# Patient Record
Sex: FEMALE | Race: WHITE | NOT HISPANIC OR LATINO | Employment: FULL TIME | URBAN - NONMETROPOLITAN AREA
[De-identification: names, ages, dates, MRNs, and addresses within clinical notes are randomized per-mention and may not be internally consistent; named-entity substitution may affect disease eponyms.]

---

## 2017-01-11 ENCOUNTER — APPOINTMENT (OUTPATIENT)
Dept: PHYSICAL THERAPY | Facility: CLINIC | Age: 62
End: 2017-01-11
Payer: COMMERCIAL

## 2017-01-11 PROCEDURE — 97162 PT EVAL MOD COMPLEX 30 MIN: CPT

## 2017-01-12 ENCOUNTER — APPOINTMENT (OUTPATIENT)
Dept: PHYSICAL THERAPY | Facility: CLINIC | Age: 62
End: 2017-01-12
Payer: COMMERCIAL

## 2017-02-06 ENCOUNTER — APPOINTMENT (OUTPATIENT)
Dept: PHYSICAL THERAPY | Facility: CLINIC | Age: 62
End: 2017-02-06
Payer: COMMERCIAL

## 2017-02-06 PROCEDURE — 97110 THERAPEUTIC EXERCISES: CPT

## 2017-02-06 PROCEDURE — 97140 MANUAL THERAPY 1/> REGIONS: CPT

## 2017-02-06 PROCEDURE — 97162 PT EVAL MOD COMPLEX 30 MIN: CPT

## 2017-02-08 ENCOUNTER — APPOINTMENT (OUTPATIENT)
Dept: PHYSICAL THERAPY | Facility: CLINIC | Age: 62
End: 2017-02-08
Payer: COMMERCIAL

## 2017-02-08 PROCEDURE — 97140 MANUAL THERAPY 1/> REGIONS: CPT

## 2017-02-08 PROCEDURE — 97110 THERAPEUTIC EXERCISES: CPT

## 2017-02-13 ENCOUNTER — APPOINTMENT (OUTPATIENT)
Dept: PHYSICAL THERAPY | Facility: CLINIC | Age: 62
End: 2017-02-13
Payer: COMMERCIAL

## 2017-02-15 ENCOUNTER — APPOINTMENT (OUTPATIENT)
Dept: PHYSICAL THERAPY | Facility: CLINIC | Age: 62
End: 2017-02-15
Payer: COMMERCIAL

## 2017-02-15 PROCEDURE — 97110 THERAPEUTIC EXERCISES: CPT

## 2017-02-15 PROCEDURE — 97140 MANUAL THERAPY 1/> REGIONS: CPT

## 2017-02-16 ENCOUNTER — APPOINTMENT (OUTPATIENT)
Dept: PHYSICAL THERAPY | Facility: CLINIC | Age: 62
End: 2017-02-16
Payer: COMMERCIAL

## 2017-02-16 PROCEDURE — 97140 MANUAL THERAPY 1/> REGIONS: CPT

## 2017-02-16 PROCEDURE — 97110 THERAPEUTIC EXERCISES: CPT

## 2017-02-20 ENCOUNTER — APPOINTMENT (OUTPATIENT)
Dept: PHYSICAL THERAPY | Facility: CLINIC | Age: 62
End: 2017-02-20
Payer: COMMERCIAL

## 2017-02-20 PROCEDURE — 97140 MANUAL THERAPY 1/> REGIONS: CPT

## 2017-02-20 PROCEDURE — 97110 THERAPEUTIC EXERCISES: CPT

## 2017-02-22 ENCOUNTER — APPOINTMENT (OUTPATIENT)
Dept: PHYSICAL THERAPY | Facility: CLINIC | Age: 62
End: 2017-02-22
Payer: COMMERCIAL

## 2017-02-22 PROCEDURE — 97140 MANUAL THERAPY 1/> REGIONS: CPT

## 2017-02-22 PROCEDURE — 97110 THERAPEUTIC EXERCISES: CPT

## 2017-02-23 ENCOUNTER — APPOINTMENT (OUTPATIENT)
Dept: PHYSICAL THERAPY | Facility: CLINIC | Age: 62
End: 2017-02-23
Payer: COMMERCIAL

## 2017-02-27 ENCOUNTER — APPOINTMENT (OUTPATIENT)
Dept: PHYSICAL THERAPY | Facility: CLINIC | Age: 62
End: 2017-02-27
Payer: COMMERCIAL

## 2017-03-01 ENCOUNTER — APPOINTMENT (OUTPATIENT)
Dept: PHYSICAL THERAPY | Facility: CLINIC | Age: 62
End: 2017-03-01
Payer: COMMERCIAL

## 2017-03-01 PROCEDURE — 97110 THERAPEUTIC EXERCISES: CPT

## 2017-03-01 PROCEDURE — 97140 MANUAL THERAPY 1/> REGIONS: CPT

## 2017-03-02 ENCOUNTER — APPOINTMENT (OUTPATIENT)
Dept: PHYSICAL THERAPY | Facility: CLINIC | Age: 62
End: 2017-03-02
Payer: COMMERCIAL

## 2017-03-08 ENCOUNTER — APPOINTMENT (OUTPATIENT)
Dept: PHYSICAL THERAPY | Facility: CLINIC | Age: 62
End: 2017-03-08
Payer: COMMERCIAL

## 2017-03-08 PROCEDURE — 97140 MANUAL THERAPY 1/> REGIONS: CPT

## 2017-03-08 PROCEDURE — 97110 THERAPEUTIC EXERCISES: CPT

## 2017-03-09 ENCOUNTER — APPOINTMENT (OUTPATIENT)
Dept: PHYSICAL THERAPY | Facility: CLINIC | Age: 62
End: 2017-03-09
Payer: COMMERCIAL

## 2017-03-13 ENCOUNTER — APPOINTMENT (OUTPATIENT)
Dept: PHYSICAL THERAPY | Facility: CLINIC | Age: 62
End: 2017-03-13
Payer: COMMERCIAL

## 2017-03-13 PROCEDURE — 97164 PT RE-EVAL EST PLAN CARE: CPT

## 2017-03-13 PROCEDURE — 97110 THERAPEUTIC EXERCISES: CPT

## 2017-03-13 PROCEDURE — 97140 MANUAL THERAPY 1/> REGIONS: CPT

## 2017-03-16 ENCOUNTER — APPOINTMENT (OUTPATIENT)
Dept: PHYSICAL THERAPY | Facility: CLINIC | Age: 62
End: 2017-03-16
Payer: COMMERCIAL

## 2017-03-20 ENCOUNTER — APPOINTMENT (OUTPATIENT)
Dept: PHYSICAL THERAPY | Facility: CLINIC | Age: 62
End: 2017-03-20
Payer: COMMERCIAL

## 2017-03-22 ENCOUNTER — APPOINTMENT (OUTPATIENT)
Dept: PHYSICAL THERAPY | Facility: CLINIC | Age: 62
End: 2017-03-22
Payer: COMMERCIAL

## 2017-03-22 PROCEDURE — 97110 THERAPEUTIC EXERCISES: CPT

## 2017-03-22 PROCEDURE — 97140 MANUAL THERAPY 1/> REGIONS: CPT

## 2017-04-18 ENCOUNTER — APPOINTMENT (OUTPATIENT)
Dept: PHYSICAL THERAPY | Facility: CLINIC | Age: 62
End: 2017-04-18
Payer: COMMERCIAL

## 2017-04-18 PROCEDURE — 97140 MANUAL THERAPY 1/> REGIONS: CPT

## 2017-04-18 PROCEDURE — 97161 PT EVAL LOW COMPLEX 20 MIN: CPT

## 2017-04-20 ENCOUNTER — APPOINTMENT (OUTPATIENT)
Dept: PHYSICAL THERAPY | Facility: CLINIC | Age: 62
End: 2017-04-20
Payer: COMMERCIAL

## 2017-04-20 PROCEDURE — 97140 MANUAL THERAPY 1/> REGIONS: CPT

## 2017-04-20 PROCEDURE — 97110 THERAPEUTIC EXERCISES: CPT

## 2017-04-24 ENCOUNTER — APPOINTMENT (OUTPATIENT)
Dept: PHYSICAL THERAPY | Facility: CLINIC | Age: 62
End: 2017-04-24
Payer: COMMERCIAL

## 2017-04-24 PROCEDURE — 97140 MANUAL THERAPY 1/> REGIONS: CPT

## 2017-04-24 PROCEDURE — 97110 THERAPEUTIC EXERCISES: CPT

## 2017-04-26 ENCOUNTER — APPOINTMENT (OUTPATIENT)
Dept: PHYSICAL THERAPY | Facility: CLINIC | Age: 62
End: 2017-04-26
Payer: COMMERCIAL

## 2017-04-26 PROCEDURE — 97140 MANUAL THERAPY 1/> REGIONS: CPT

## 2017-04-26 PROCEDURE — 97110 THERAPEUTIC EXERCISES: CPT

## 2017-05-01 ENCOUNTER — APPOINTMENT (OUTPATIENT)
Dept: PHYSICAL THERAPY | Facility: CLINIC | Age: 62
End: 2017-05-01
Payer: COMMERCIAL

## 2017-05-03 ENCOUNTER — APPOINTMENT (OUTPATIENT)
Dept: PHYSICAL THERAPY | Facility: CLINIC | Age: 62
End: 2017-05-03
Payer: COMMERCIAL

## 2017-05-04 ENCOUNTER — APPOINTMENT (OUTPATIENT)
Dept: PHYSICAL THERAPY | Facility: CLINIC | Age: 62
End: 2017-05-04
Payer: COMMERCIAL

## 2017-05-04 PROCEDURE — 97140 MANUAL THERAPY 1/> REGIONS: CPT

## 2017-05-04 PROCEDURE — 97110 THERAPEUTIC EXERCISES: CPT

## 2017-05-04 PROCEDURE — G0283 ELEC STIM OTHER THAN WOUND: HCPCS

## 2017-05-04 PROCEDURE — 97014 ELECTRIC STIMULATION THERAPY: CPT

## 2017-05-08 ENCOUNTER — APPOINTMENT (OUTPATIENT)
Dept: PHYSICAL THERAPY | Facility: CLINIC | Age: 62
End: 2017-05-08
Payer: COMMERCIAL

## 2017-05-08 PROCEDURE — 64550 HB APPLY NEUROSTIMULATOR: CPT

## 2017-05-08 PROCEDURE — G0283 ELEC STIM OTHER THAN WOUND: HCPCS

## 2017-05-08 PROCEDURE — 97140 MANUAL THERAPY 1/> REGIONS: CPT

## 2017-05-08 PROCEDURE — 97014 ELECTRIC STIMULATION THERAPY: CPT

## 2017-05-08 PROCEDURE — 97110 THERAPEUTIC EXERCISES: CPT

## 2017-05-10 ENCOUNTER — APPOINTMENT (OUTPATIENT)
Dept: PHYSICAL THERAPY | Facility: CLINIC | Age: 62
End: 2017-05-10
Payer: COMMERCIAL

## 2017-05-10 PROCEDURE — 97140 MANUAL THERAPY 1/> REGIONS: CPT

## 2017-05-10 PROCEDURE — 97035 APP MDLTY 1+ULTRASOUND EA 15: CPT

## 2017-05-10 PROCEDURE — 97110 THERAPEUTIC EXERCISES: CPT

## 2017-05-15 ENCOUNTER — APPOINTMENT (OUTPATIENT)
Dept: PHYSICAL THERAPY | Facility: CLINIC | Age: 62
End: 2017-05-15
Payer: COMMERCIAL

## 2017-05-15 PROCEDURE — 97035 APP MDLTY 1+ULTRASOUND EA 15: CPT

## 2017-05-15 PROCEDURE — 97140 MANUAL THERAPY 1/> REGIONS: CPT

## 2017-05-15 PROCEDURE — 97110 THERAPEUTIC EXERCISES: CPT

## 2017-05-17 ENCOUNTER — APPOINTMENT (OUTPATIENT)
Dept: PHYSICAL THERAPY | Facility: CLINIC | Age: 62
End: 2017-05-17
Payer: COMMERCIAL

## 2017-05-18 ENCOUNTER — APPOINTMENT (OUTPATIENT)
Dept: PHYSICAL THERAPY | Facility: CLINIC | Age: 62
End: 2017-05-18
Payer: COMMERCIAL

## 2017-05-22 ENCOUNTER — APPOINTMENT (OUTPATIENT)
Dept: PHYSICAL THERAPY | Facility: CLINIC | Age: 62
End: 2017-05-22
Payer: COMMERCIAL

## 2017-05-22 PROCEDURE — 97140 MANUAL THERAPY 1/> REGIONS: CPT

## 2017-05-22 PROCEDURE — 97035 APP MDLTY 1+ULTRASOUND EA 15: CPT

## 2017-05-22 PROCEDURE — 97110 THERAPEUTIC EXERCISES: CPT

## 2017-05-22 PROCEDURE — 97164 PT RE-EVAL EST PLAN CARE: CPT

## 2017-05-24 ENCOUNTER — APPOINTMENT (OUTPATIENT)
Dept: PHYSICAL THERAPY | Facility: CLINIC | Age: 62
End: 2017-05-24
Payer: COMMERCIAL

## 2017-05-25 ENCOUNTER — APPOINTMENT (OUTPATIENT)
Dept: PHYSICAL THERAPY | Facility: CLINIC | Age: 62
End: 2017-05-25
Payer: COMMERCIAL

## 2017-05-25 PROCEDURE — 97110 THERAPEUTIC EXERCISES: CPT

## 2017-05-25 PROCEDURE — 97140 MANUAL THERAPY 1/> REGIONS: CPT

## 2017-05-25 PROCEDURE — 97035 APP MDLTY 1+ULTRASOUND EA 15: CPT

## 2017-05-31 ENCOUNTER — APPOINTMENT (OUTPATIENT)
Dept: PHYSICAL THERAPY | Facility: CLINIC | Age: 62
End: 2017-05-31
Payer: COMMERCIAL

## 2017-05-31 PROCEDURE — 97110 THERAPEUTIC EXERCISES: CPT

## 2017-05-31 PROCEDURE — 97140 MANUAL THERAPY 1/> REGIONS: CPT

## 2017-05-31 PROCEDURE — 97035 APP MDLTY 1+ULTRASOUND EA 15: CPT

## 2017-06-01 ENCOUNTER — APPOINTMENT (OUTPATIENT)
Dept: PHYSICAL THERAPY | Facility: CLINIC | Age: 62
End: 2017-06-01
Payer: COMMERCIAL

## 2017-06-01 PROCEDURE — 97140 MANUAL THERAPY 1/> REGIONS: CPT

## 2017-06-01 PROCEDURE — 97110 THERAPEUTIC EXERCISES: CPT

## 2017-06-07 ENCOUNTER — APPOINTMENT (OUTPATIENT)
Dept: PHYSICAL THERAPY | Facility: CLINIC | Age: 62
End: 2017-06-07
Payer: COMMERCIAL

## 2017-06-07 PROCEDURE — 97110 THERAPEUTIC EXERCISES: CPT

## 2017-06-07 PROCEDURE — 97140 MANUAL THERAPY 1/> REGIONS: CPT

## 2017-06-08 ENCOUNTER — APPOINTMENT (OUTPATIENT)
Dept: PHYSICAL THERAPY | Facility: CLINIC | Age: 62
End: 2017-06-08
Payer: COMMERCIAL

## 2017-06-08 PROCEDURE — 97110 THERAPEUTIC EXERCISES: CPT

## 2017-06-08 PROCEDURE — 97140 MANUAL THERAPY 1/> REGIONS: CPT

## 2017-06-12 ENCOUNTER — APPOINTMENT (OUTPATIENT)
Dept: PHYSICAL THERAPY | Facility: CLINIC | Age: 62
End: 2017-06-12
Payer: COMMERCIAL

## 2017-06-14 ENCOUNTER — APPOINTMENT (OUTPATIENT)
Dept: PHYSICAL THERAPY | Facility: CLINIC | Age: 62
End: 2017-06-14
Payer: COMMERCIAL

## 2017-06-14 PROCEDURE — 97140 MANUAL THERAPY 1/> REGIONS: CPT

## 2017-06-14 PROCEDURE — 97110 THERAPEUTIC EXERCISES: CPT

## 2017-06-15 ENCOUNTER — APPOINTMENT (OUTPATIENT)
Dept: PHYSICAL THERAPY | Facility: CLINIC | Age: 62
End: 2017-06-15
Payer: COMMERCIAL

## 2017-06-15 PROCEDURE — 97110 THERAPEUTIC EXERCISES: CPT

## 2017-06-15 PROCEDURE — 97140 MANUAL THERAPY 1/> REGIONS: CPT

## 2017-06-19 ENCOUNTER — APPOINTMENT (OUTPATIENT)
Dept: PHYSICAL THERAPY | Facility: CLINIC | Age: 62
End: 2017-06-19
Payer: COMMERCIAL

## 2017-06-21 ENCOUNTER — APPOINTMENT (OUTPATIENT)
Dept: PHYSICAL THERAPY | Facility: CLINIC | Age: 62
End: 2017-06-21
Payer: COMMERCIAL

## 2017-06-21 PROCEDURE — 97140 MANUAL THERAPY 1/> REGIONS: CPT

## 2017-06-21 PROCEDURE — 97110 THERAPEUTIC EXERCISES: CPT

## 2017-06-22 ENCOUNTER — APPOINTMENT (OUTPATIENT)
Dept: PHYSICAL THERAPY | Facility: CLINIC | Age: 62
End: 2017-06-22
Payer: COMMERCIAL

## 2017-06-28 ENCOUNTER — APPOINTMENT (OUTPATIENT)
Dept: PHYSICAL THERAPY | Facility: CLINIC | Age: 62
End: 2017-06-28
Payer: COMMERCIAL

## 2017-06-28 PROCEDURE — 97140 MANUAL THERAPY 1/> REGIONS: CPT

## 2017-06-28 PROCEDURE — 97110 THERAPEUTIC EXERCISES: CPT

## 2017-06-29 ENCOUNTER — APPOINTMENT (OUTPATIENT)
Dept: PHYSICAL THERAPY | Facility: CLINIC | Age: 62
End: 2017-06-29
Payer: COMMERCIAL

## 2017-06-29 PROCEDURE — 97110 THERAPEUTIC EXERCISES: CPT

## 2017-06-29 PROCEDURE — 97140 MANUAL THERAPY 1/> REGIONS: CPT

## 2018-10-03 ENCOUNTER — DOCTOR'S OFFICE (OUTPATIENT)
Dept: URBAN - NONMETROPOLITAN AREA CLINIC 1 | Facility: CLINIC | Age: 63
Setting detail: OPHTHALMOLOGY
End: 2018-10-03
Payer: COMMERCIAL

## 2018-10-03 ENCOUNTER — OPTICAL OFFICE (OUTPATIENT)
Dept: URBAN - NONMETROPOLITAN AREA CLINIC 4 | Facility: CLINIC | Age: 63
Setting detail: OPHTHALMOLOGY
End: 2018-10-03
Payer: COMMERCIAL

## 2018-10-03 ENCOUNTER — RX ONLY (RX ONLY)
Age: 63
End: 2018-10-03

## 2018-10-03 DIAGNOSIS — H52.223: ICD-10-CM

## 2018-10-03 DIAGNOSIS — H52.4: ICD-10-CM

## 2018-10-03 DIAGNOSIS — H35.3131: ICD-10-CM

## 2018-10-03 DIAGNOSIS — H35.3111: ICD-10-CM

## 2018-10-03 DIAGNOSIS — H35.3121: ICD-10-CM

## 2018-10-03 DIAGNOSIS — H52.13: ICD-10-CM

## 2018-10-03 PROCEDURE — V2020 VISION SVCS FRAMES PURCHASES: HCPCS | Performed by: OPTOMETRIST

## 2018-10-03 PROCEDURE — 92004 COMPRE OPH EXAM NEW PT 1/>: CPT | Performed by: OPTOMETRIST

## 2018-10-03 PROCEDURE — 92134 CPTRZ OPH DX IMG PST SGM RTA: CPT | Performed by: OPTOMETRIST

## 2018-10-03 PROCEDURE — 92015 DETERMINE REFRACTIVE STATE: CPT | Performed by: OPTOMETRIST

## 2018-10-03 PROCEDURE — V2784 LENS POLYCARB OR EQUAL: HCPCS | Performed by: OPTOMETRIST

## 2018-10-03 PROCEDURE — V2025 EYEGLASSES DELUX FRAMES: HCPCS | Performed by: OPTOMETRIST

## 2018-10-03 PROCEDURE — 92250 FUNDUS PHOTOGRAPHY W/I&R: CPT | Performed by: OPTOMETRIST

## 2018-10-03 PROCEDURE — V2103 SPHEROCYLINDR 4.00D/12-2.00D: HCPCS | Performed by: OPTOMETRIST

## 2018-10-03 ASSESSMENT — REFRACTION_AUTOREFRACTION
OD_AXIS: 167
OS_SPHERE: -1.00
OS_AXIS: 151
OD_CYLINDER: -0.75
OS_CYLINDER: -1.00
OD_SPHERE: -0.50

## 2018-10-03 ASSESSMENT — REFRACTION_MANIFEST
OS_CYLINDER: -0.75
OD_ADD: +2.50
OS_VA3: 20/
OS_VA3: 20/
OS_VA2: 20/
OD_AXIS: 150
OD_VA3: 20/
OD_VA3: 20/
OD_VA2: 20/20-2
OU_VA: 20/
OD_VA1: 20/
OS_SPHERE: -1.50
OD_VA1: 20/20-2
OS_VA1: 20/20-2
OD_SPHERE: -1.00
OS_VA1: 20/
OS_VA2: 20/20-2
OD_CYLINDER: -1.00
OD_VA2: 20/
OS_AXIS: 150
OU_VA: 20/
OS_ADD: +2.50

## 2018-10-03 ASSESSMENT — SPHEQUIV_DERIVED
OS_SPHEQUIV: -1.5
OD_SPHEQUIV: -1.5
OD_SPHEQUIV: -0.875
OS_SPHEQUIV: -1.875

## 2018-10-03 ASSESSMENT — REFRACTION_CURRENTRX
OS_OVR_VA: 20/
OS_AXIS: 153
OD_VPRISM_DIRECTION: SV
OD_AXIS: 150
OD_CYLINDER: -1.00
OS_CYLINDER: -0.75
OD_SPHERE: -2.00
OS_VPRISM_DIRECTION: SV
OS_OVR_VA: 20/
OS_OVR_VA: 20/
OD_OVR_VA: 20/
OS_SPHERE: -2.50
OD_OVR_VA: 20/
OD_OVR_VA: 20/

## 2018-10-03 ASSESSMENT — KERATOMETRY
OD_AXISANGLE_DEGREES: 176
OS_AXISANGLE_DEGREES: 178
OD_K1POWER_DIOPTERS: 43.00
OS_K2POWER_DIOPTERS: 44.25
OD_K2POWER_DIOPTERS: 44.00
OS_K1POWER_DIOPTERS: 43.00

## 2018-10-03 ASSESSMENT — VISUAL ACUITY
OS_BCVA: 20/25-1
OD_BCVA: 20/30

## 2018-10-03 ASSESSMENT — AXIALLENGTH_DERIVED
OD_AL: 24.1912
OD_AL: 23.9378
OS_AL: 24.143
OS_AL: 24.297

## 2018-10-03 ASSESSMENT — CONFRONTATIONAL VISUAL FIELD TEST (CVF)
OD_FINDINGS: FULL
OS_FINDINGS: FULL

## 2019-10-04 ENCOUNTER — DOCTOR'S OFFICE (OUTPATIENT)
Dept: URBAN - NONMETROPOLITAN AREA CLINIC 1 | Facility: CLINIC | Age: 64
Setting detail: OPHTHALMOLOGY
End: 2019-10-04
Payer: COMMERCIAL

## 2019-10-04 ENCOUNTER — RX ONLY (RX ONLY)
Age: 64
End: 2019-10-04

## 2019-10-04 DIAGNOSIS — H35.3111: ICD-10-CM

## 2019-10-04 DIAGNOSIS — H35.3121: ICD-10-CM

## 2019-10-04 DIAGNOSIS — H25.13: ICD-10-CM

## 2019-10-04 PROCEDURE — 92134 CPTRZ OPH DX IMG PST SGM RTA: CPT | Performed by: OPTOMETRIST

## 2019-10-04 PROCEDURE — 92014 COMPRE OPH EXAM EST PT 1/>: CPT | Performed by: OPTOMETRIST

## 2019-10-04 ASSESSMENT — REFRACTION_MANIFEST
OS_VA2: 20/20-2
OD_VA1: 20/20-2
OD_VA3: 20/
OS_ADD: +2.50
OU_VA: 20/
OU_VA: 20/
OS_VA2: 20/
OD_VA1: 20/
OD_VA2: 20/
OD_VA2: 20/20-2
OD_VA3: 20/
OS_VA1: 20/20-2
OS_CYLINDER: -0.75
OD_CYLINDER: -1.00
OD_SPHERE: -1.00
OD_ADD: +2.50
OD_AXIS: 150
OS_VA3: 20/
OS_SPHERE: -1.50
OS_AXIS: 150
OS_VA1: 20/
OS_VA3: 20/

## 2019-10-04 ASSESSMENT — SPHEQUIV_DERIVED
OS_SPHEQUIV: -1.5
OD_SPHEQUIV: -0.875
OS_SPHEQUIV: -1.875
OD_SPHEQUIV: -1.5

## 2019-10-04 ASSESSMENT — REFRACTION_CURRENTRX
OS_OVR_VA: 20/
OD_AXIS: 150
OS_VPRISM_DIRECTION: SV
OS_SPHERE: -2.50
OD_OVR_VA: 20/
OD_OVR_VA: 20/
OS_AXIS: 153
OD_OVR_VA: 20/
OD_SPHERE: -2.00
OS_OVR_VA: 20/
OD_CYLINDER: -1.00
OD_VPRISM_DIRECTION: SV
OS_OVR_VA: 20/
OS_CYLINDER: -0.75

## 2019-10-04 ASSESSMENT — REFRACTION_AUTOREFRACTION
OS_AXIS: 151
OD_CYLINDER: -0.75
OS_CYLINDER: -1.00
OS_SPHERE: -1.00
OD_SPHERE: -0.50
OD_AXIS: 167

## 2019-10-04 ASSESSMENT — CONFRONTATIONAL VISUAL FIELD TEST (CVF)
OS_FINDINGS: FULL
OD_FINDINGS: FULL

## 2019-10-04 ASSESSMENT — VISUAL ACUITY
OS_BCVA: 20/30
OD_BCVA: 20/25

## 2020-10-07 ENCOUNTER — DOCTOR'S OFFICE (OUTPATIENT)
Dept: URBAN - NONMETROPOLITAN AREA CLINIC 1 | Facility: CLINIC | Age: 65
Setting detail: OPHTHALMOLOGY
End: 2020-10-07
Payer: COMMERCIAL

## 2020-10-07 ENCOUNTER — OPTICAL OFFICE (OUTPATIENT)
Dept: URBAN - NONMETROPOLITAN AREA CLINIC 4 | Facility: CLINIC | Age: 65
Setting detail: OPHTHALMOLOGY
End: 2020-10-07
Payer: COMMERCIAL

## 2020-10-07 DIAGNOSIS — H52.223: ICD-10-CM

## 2020-10-07 DIAGNOSIS — H35.3121: ICD-10-CM

## 2020-10-07 DIAGNOSIS — H52.4: ICD-10-CM

## 2020-10-07 DIAGNOSIS — H52.13: ICD-10-CM

## 2020-10-07 DIAGNOSIS — H35.3111: ICD-10-CM

## 2020-10-07 PROCEDURE — V2020 VISION SVCS FRAMES PURCHASES: HCPCS | Performed by: OPTOMETRIST

## 2020-10-07 PROCEDURE — 92134 CPTRZ OPH DX IMG PST SGM RTA: CPT | Performed by: OPTOMETRIST

## 2020-10-07 PROCEDURE — V2784 LENS POLYCARB OR EQUAL: HCPCS | Performed by: OPTOMETRIST

## 2020-10-07 PROCEDURE — V2025 EYEGLASSES DELUX FRAMES: HCPCS | Performed by: OPTOMETRIST

## 2020-10-07 PROCEDURE — V2103 SPHEROCYLINDR 4.00D/12-2.00D: HCPCS | Performed by: OPTOMETRIST

## 2020-10-07 PROCEDURE — 92015 DETERMINE REFRACTIVE STATE: CPT | Performed by: OPTOMETRIST

## 2020-10-07 PROCEDURE — 92014 COMPRE OPH EXAM EST PT 1/>: CPT | Performed by: OPTOMETRIST

## 2020-10-07 ASSESSMENT — REFRACTION_MANIFEST
OS_VA1: 20/20-2
OS_CYLINDER: -0.25
OD_VA1: 20/20-2
OD_AXIS: 165
OS_SPHERE: -1.50
OS_VA2: 20/20-2
OD_VA2: 20/20-2
OS_AXIS: 150
OD_SPHERE: -1.25
OS_ADD: +2.50
OD_CYLINDER: -0.75
OD_ADD: +2.50

## 2020-10-07 ASSESSMENT — REFRACTION_AUTOREFRACTION
OS_CYLINDER: SPH
OD_SPHERE: -1.25
OD_AXIS: 164
OD_CYLINDER: -0.75
OS_SPHERE: -1.75

## 2020-10-07 ASSESSMENT — REFRACTION_CURRENTRX
OD_SPHERE: -1.00
OD_VPRISM_DIRECTION: SV
OD_AXIS: 150
OS_OVR_VA: 20/
OD_CYLINDER: -1.00
OD_OVR_VA: 20/
OS_CYLINDER: -0.75
OS_VPRISM_DIRECTION: SV
OS_SPHERE: -1.50
OS_AXIS: 150

## 2020-10-07 ASSESSMENT — SPHEQUIV_DERIVED
OS_SPHEQUIV: -1.625
OD_SPHEQUIV: -1.625
OD_SPHEQUIV: -1.625

## 2020-10-07 ASSESSMENT — CONFRONTATIONAL VISUAL FIELD TEST (CVF)
OS_FINDINGS: FULL
OD_FINDINGS: FULL

## 2020-10-07 ASSESSMENT — AXIALLENGTH_DERIVED
OS_AL: 24.1941
OD_AL: 24.2425
OD_AL: 24.2425

## 2020-10-07 ASSESSMENT — KERATOMETRY
OS_AXISANGLE_DEGREES: 178
OS_K1POWER_DIOPTERS: 43.00
OD_K1POWER_DIOPTERS: 43.00
OD_K2POWER_DIOPTERS: 44.00
OS_K2POWER_DIOPTERS: 44.25
OD_AXISANGLE_DEGREES: 176

## 2020-10-07 ASSESSMENT — VISUAL ACUITY
OD_BCVA: 20/25-2
OS_BCVA: 20/30

## 2020-10-08 ENCOUNTER — DOCTOR'S OFFICE (OUTPATIENT)
Dept: URBAN - NONMETROPOLITAN AREA CLINIC 1 | Facility: CLINIC | Age: 65
Setting detail: OPHTHALMOLOGY
End: 2020-10-08
Payer: COMMERCIAL

## 2020-10-08 DIAGNOSIS — H35.3131: ICD-10-CM

## 2020-10-08 PROCEDURE — 92134 CPTRZ OPH DX IMG PST SGM RTA: CPT | Performed by: OPTOMETRIST

## 2021-10-08 ENCOUNTER — DOCTOR'S OFFICE (OUTPATIENT)
Dept: URBAN - NONMETROPOLITAN AREA CLINIC 1 | Facility: CLINIC | Age: 66
Setting detail: OPHTHALMOLOGY
End: 2021-10-08
Payer: COMMERCIAL

## 2021-10-08 DIAGNOSIS — H52.13: ICD-10-CM

## 2021-10-08 DIAGNOSIS — H35.3131: ICD-10-CM

## 2021-10-08 DIAGNOSIS — H35.3121: ICD-10-CM

## 2021-10-08 DIAGNOSIS — H35.3111: ICD-10-CM

## 2021-10-08 PROBLEM — H25.13 CATARACT NUCLEAR SCLEROSIS AGE RELATED; BOTH EYES: Status: ACTIVE | Noted: 2018-10-03

## 2021-10-08 PROCEDURE — 92014 COMPRE OPH EXAM EST PT 1/>: CPT | Performed by: OPTOMETRIST

## 2021-10-08 PROCEDURE — 92134 CPTRZ OPH DX IMG PST SGM RTA: CPT | Performed by: OPTOMETRIST

## 2021-10-08 ASSESSMENT — TONOMETRY
OD_IOP_MMHG: 14
OS_IOP_MMHG: 14

## 2021-10-08 ASSESSMENT — REFRACTION_CURRENTRX
OD_VPRISM_DIRECTION: SV
OS_OVR_VA: 20/
OD_CYLINDER: -0.75
OS_SPHERE: -1.50
OS_CYLINDER: -0.25
OD_SPHERE: -1.25
OS_AXIS: 146
OD_OVR_VA: 20/
OS_VPRISM_DIRECTION: SV
OD_AXIS: 168

## 2021-10-08 ASSESSMENT — AXIALLENGTH_DERIVED
OD_AL: 24.2425
OD_AL: 24.0892
OS_AL: 24.0921
OS_AL: 23.9909

## 2021-10-08 ASSESSMENT — SPHEQUIV_DERIVED
OS_SPHEQUIV: -1.375
OD_SPHEQUIV: -1.625
OS_SPHEQUIV: -1.125
OD_SPHEQUIV: -1.25

## 2021-10-08 ASSESSMENT — KERATOMETRY
OD_AXISANGLE_DEGREES: 176
OD_K2POWER_DIOPTERS: 44.00
OS_K1POWER_DIOPTERS: 43.00
OS_K2POWER_DIOPTERS: 44.25
OD_K1POWER_DIOPTERS: 43.00
OS_AXISANGLE_DEGREES: 178

## 2021-10-08 ASSESSMENT — REFRACTION_MANIFEST
OD_SPHERE: -1.25
OD_VA2: 20/20-2
OS_AXIS: 150
OS_VA2: 20/20-2
OD_ADD: +2.50
OS_SPHERE: -1.25
OS_ADD: +2.50
OD_CYLINDER: -0.75
OD_AXIS: 165
OD_VA1: 20/20-2
OS_VA1: 20/20-2
OS_CYLINDER: -0.25

## 2021-10-08 ASSESSMENT — VISUAL ACUITY
OS_BCVA: 20/20-2
OD_BCVA: 20/20-1

## 2021-10-08 ASSESSMENT — REFRACTION_AUTOREFRACTION
OS_SPHERE: -1.00
OD_AXIS: 159
OD_SPHERE: -1.00
OS_CYLINDER: -0.25
OS_AXIS: 124
OD_CYLINDER: -0.50

## 2021-10-08 ASSESSMENT — CONFRONTATIONAL VISUAL FIELD TEST (CVF)
OS_FINDINGS: FULL
OD_FINDINGS: FULL

## 2022-11-30 ENCOUNTER — DOCTOR'S OFFICE (OUTPATIENT)
Dept: URBAN - NONMETROPOLITAN AREA CLINIC 1 | Facility: CLINIC | Age: 67
Setting detail: OPHTHALMOLOGY
End: 2022-11-30
Payer: COMMERCIAL

## 2022-11-30 DIAGNOSIS — H35.3131: ICD-10-CM

## 2022-11-30 DIAGNOSIS — H52.4: ICD-10-CM

## 2022-11-30 DIAGNOSIS — H52.13: ICD-10-CM

## 2022-11-30 PROCEDURE — 92134 CPTRZ OPH DX IMG PST SGM RTA: CPT | Performed by: OPTOMETRIST

## 2022-11-30 PROCEDURE — 92014 COMPRE OPH EXAM EST PT 1/>: CPT | Performed by: OPTOMETRIST

## 2022-11-30 PROCEDURE — 92015 DETERMINE REFRACTIVE STATE: CPT | Performed by: OPTOMETRIST

## 2022-11-30 ASSESSMENT — REFRACTION_MANIFEST
OD_SPHERE: -1.25
OS_VA2: 20/20-2
OD_VA1: 20/20-2
OD_VA2: 20/20-2
OS_AXIS: 150
OD_CYLINDER: -0.75
OS_SPHERE: -1.25
OD_AXIS: 165
OD_ADD: +2.50
OS_ADD: +2.50
OS_VA1: 20/20-2
OS_CYLINDER: -0.25

## 2022-11-30 ASSESSMENT — VISUAL ACUITY
OD_BCVA: 20/20-1
OS_BCVA: 20/25

## 2022-11-30 ASSESSMENT — SPHEQUIV_DERIVED
OS_SPHEQUIV: -1.375
OD_SPHEQUIV: -1.625
OD_SPHEQUIV: -1.25
OS_SPHEQUIV: -1.375

## 2022-11-30 ASSESSMENT — CONFRONTATIONAL VISUAL FIELD TEST (CVF)
OD_FINDINGS: FULL
OS_FINDINGS: FULL

## 2022-11-30 ASSESSMENT — REFRACTION_AUTOREFRACTION
OD_CYLINDER: -0.50
OS_SPHERE: -1.25
OD_SPHERE: -1.00
OS_AXIS: 125
OS_CYLINDER: -0.25
OD_AXIS: 152

## 2022-11-30 ASSESSMENT — REFRACTION_CURRENTRX
OS_SPHERE: -1.25
OD_CYLINDER: -0.75
OS_OVR_VA: 20/
OD_SPHERE: -1.25
OS_VPRISM_DIRECTION: SV
OD_AXIS: 171
OD_VPRISM_DIRECTION: SV
OD_OVR_VA: 20/
OS_AXIS: 155
OS_CYLINDER: -0.25

## 2022-11-30 ASSESSMENT — TONOMETRY
OS_IOP_MMHG: 14
OD_IOP_MMHG: 14

## 2023-12-04 ENCOUNTER — DOCTOR'S OFFICE (OUTPATIENT)
Dept: URBAN - NONMETROPOLITAN AREA CLINIC 1 | Facility: CLINIC | Age: 68
Setting detail: OPHTHALMOLOGY
End: 2023-12-04
Payer: COMMERCIAL

## 2023-12-04 DIAGNOSIS — H52.13: ICD-10-CM

## 2023-12-04 DIAGNOSIS — H35.3131: ICD-10-CM

## 2023-12-04 DIAGNOSIS — H52.4: ICD-10-CM

## 2023-12-04 DIAGNOSIS — H25.13: ICD-10-CM

## 2023-12-04 DIAGNOSIS — H35.3121: ICD-10-CM

## 2023-12-04 PROCEDURE — 92134 CPTRZ OPH DX IMG PST SGM RTA: CPT | Performed by: OPTOMETRIST

## 2023-12-04 PROCEDURE — 92015 DETERMINE REFRACTIVE STATE: CPT | Performed by: OPTOMETRIST

## 2023-12-04 PROCEDURE — 92014 COMPRE OPH EXAM EST PT 1/>: CPT | Performed by: OPTOMETRIST

## 2023-12-04 ASSESSMENT — REFRACTION_MANIFEST
OS_SPHERE: -1.25
OD_VA1: 20/25+2
OS_ADD: +2.50
OS_VA1: 20/20-2
OS_CYLINDER: -0.25
OD_AXIS: 140
OD_VA2: 20/20-2
OS_AXIS: 150
OD_ADD: +2.50
OS_VA2: 20/20-2
OD_SPHERE: -1.25
OD_CYLINDER: -0.75

## 2023-12-04 ASSESSMENT — REFRACTION_CURRENTRX
OD_SPHERE: -1.25
OS_CYLINDER: -0.25
OD_AXIS: 171
OD_VPRISM_DIRECTION: SV
OS_VPRISM_DIRECTION: SV
OS_SPHERE: -1.25
OD_OVR_VA: 20/
OS_OVR_VA: 20/
OD_CYLINDER: -0.75
OS_AXIS: 155

## 2023-12-04 ASSESSMENT — REFRACTION_AUTOREFRACTION
OS_AXIS: 125
OD_CYLINDER: -0.50
OD_SPHERE: -1.00
OD_AXIS: 152
OS_CYLINDER: -0.25
OS_SPHERE: -1.25

## 2023-12-04 ASSESSMENT — SPHEQUIV_DERIVED
OS_SPHEQUIV: -1.375
OS_SPHEQUIV: -1.375
OD_SPHEQUIV: -1.25
OD_SPHEQUIV: -1.625

## 2023-12-04 ASSESSMENT — CONFRONTATIONAL VISUAL FIELD TEST (CVF)
OS_FINDINGS: FULL
OD_FINDINGS: FULL

## 2024-09-05 ENCOUNTER — EVALUATION (OUTPATIENT)
Dept: PHYSICAL THERAPY | Facility: CLINIC | Age: 69
End: 2024-09-05
Payer: COMMERCIAL

## 2024-09-05 DIAGNOSIS — M75.41 IMPINGEMENT SYNDROME OF RIGHT SHOULDER: Primary | ICD-10-CM

## 2024-09-05 PROCEDURE — 97110 THERAPEUTIC EXERCISES: CPT

## 2024-09-05 PROCEDURE — 97161 PT EVAL LOW COMPLEX 20 MIN: CPT

## 2024-09-05 NOTE — LETTER
2024    Marc Vitale MD  [address] [facility]  [address]  [id number]    Patient: Christy Cardozo   YOB: 1955   Date of Visit: 2024     Encounter Diagnosis     ICD-10-CM    1. Impingement syndrome of right shoulder  M75.41           Dear Dr. Vitale:    Thank you for your recent referral of Christy Cardozo. Please review the attached evaluation summary from Christy's recent visit.     Please verify that you agree with the plan of care by signing the attached order.     If you have any questions or concerns, please do not hesitate to call.     I sincerely appreciate the opportunity to share in the care of one of your patients and hope to have another opportunity to work with you in the near future.       Sincerely,    Judah Iglesias, PT      Referring Provider:      I certify that I have read the below Plan of Care and certify the need for these services furnished under this plan of treatment while under my care.                    Marc Vitale MD  [address] [facility]  [address]  [id number]  Via Fax: [phone]          PT Evaluation     Today's date: 2024  Patient name: Christy Cardozo  : 1955  MRN: [id number]  Referring provider: No ref. provider found  Dx:   Encounter Diagnosis     ICD-10-CM    1. Impingement syndrome of right shoulder  M75.41           Start Time: 1345  Stop Time: 1445  Total time in clinic (min): 60 minutes    Assessment  Impairments: abnormal or restricted ROM, activity intolerance and impaired physical strength  Symptom irritability: low    Assessment details: Christy Cardozo is a pleasant 70 y/o with PMH of a mass resection in R shoulder. Presents to therapy with c/o of R shoulder pain and weakness. Today's assessment shows deficits in in R shoulder in all planes, however no pain with resistance today.  symmetrical. Special tests reveal Negative full/empty can, wilda easley, obriens, biceps load. Objective findings,  further detailed below, along with signs and symptoms most consistent with likely rotator cuff tendinitis, likely supraspinatus involvement. As of today, she  notes no pain at rest or with activity resistance free following her injection. Though due to previous flare up with therapy will progress slowly to tolerance. at this time, they would patient would benefit from skilled therapy services to address previously stated impairments in order to maximize function with all ADLs.       Goals  ST-3 weeks  -pt will be able to   ojects ~5# to shoulder height without exacerbation of symptoms.  -pt will be able to improve R shoulder strength to 4+/5 in all planes    LT-66 weeks  -pt will be able to continue to report <1/10 in shoulder with activity  -pt will be able to  10#+ objects and move them to higher shelves pain free  -pt robbi be able to demonstrate improvement in overall function via projected FOTO score of 60      Plan  Patient would benefit from: skilled physical therapy  Referral necessary: No  Planned modality interventions: thermotherapy: hydrocollator packs and electrical stimulation/Russian stimulation    Planned therapy interventions: balance, strengthening, stretching, therapeutic activities, therapeutic exercise and flexibility    Frequency: 2x week  Duration in weeks: 6  Plan of Care beginning date: 2024  Plan of Care expiration date: 10/17/2024        Subjective Evaluation    History of Present Illness  Mechanism of injury: Christy Cardozo states that pain started nearly 2 years ago when she had a mass removed from her right shoulder. She  states she then had therapy following this. However, notes that the pain was so significant afterwards that she stopped therapy due to being unable to raise arm. She has never tried therapy since. She has consistently had pain in shoulder with reaching out to her right side and working over head.  Notes that recent months had significant increase  in clicking/cracking in shoulders, though not painful.  This has been worsening up until she received an injection in her shoulder ~ 2 weeks ago. Notes that today, she has minimal pains and discomfort compared to prior to the injection.           Recurrent probem    Quality of life: fair    Patient Goals  Patient goals for therapy: decreased pain, improved balance, increased motion, increased strength, independence with ADLs/IADLs, return to sport/leisure activities and return to work    Pain  Current pain ratin  At best pain ratin  At worst pain ratin  Location: right shoulder along supraspinatus tendon, lateral upper arm  Quality: grinding and sharp    Hand dominance: right    Treatments  Previous treatment: injection treatment and physical therapy        Objective     Tenderness     Left Shoulder   No tenderness     Right Shoulder  No tenderness     Active Range of Motion   Left Shoulder   Normal active range of motion    Right Shoulder   Normal active range of motion    Strength/Myotome Testing     Left Shoulder   Normal muscle strength    Right Shoulder     Planes of Motion   Flexion: 4   Extension: 4   Abduction: 4   External rotation at 0°: 4   External rotation at 90°: 4       Flowsheet Rows      Flowsheet Row Most Recent Value   PT/OT G-Codes    Current Score 59   Projected Score 60   FOTO information reviewed Yes   Assessment Type Evaluation             Precautions: mass resection of R shoulder     Daily Treatment Diary:      Initial Evaluation Date: 24  Compliance                      Visit Number 1                    Re-Eval  IE                 MC   Foto Captured Y                                                Manual                                                                                        Ther-Ex                      UBE ->            Shldr flx/scap/abd  AROM                                           Wand OH shoulder raises 1#->                                            No monies OTB                                                                 Prone y raise ->                     Prone rear delt raise ->                                           Neuro Re-Ed                                                                                                Ther-Act              moving objects to shoulder height+ When tolerable                                                Modalities

## 2024-09-05 NOTE — PROGRESS NOTES
PT Evaluation     Today's date: 2024  Patient name: Christy Cardozo  : 1955  MRN: [id number]  Referring provider: No ref. provider found  Dx:   Encounter Diagnosis     ICD-10-CM    1. Impingement syndrome of right shoulder  M75.41           Start Time: 1345  Stop Time: 1445  Total time in clinic (min): 60 minutes    Assessment  Impairments: abnormal or restricted ROM, activity intolerance and impaired physical strength  Symptom irritability: low    Assessment details: Christy Cardozo is a pleasant 68 y/o with PMH of a mass resection in R shoulder. Presents to therapy with c/o of R shoulder pain and weakness. Today's assessment shows deficits in in R shoulder in all planes, however no pain with resistance today.  symmetrical. Special tests reveal Negative full/empty can, wilda easley, obriens, biceps load. Objective findings, further detailed below, along with signs and symptoms most consistent with likely rotator cuff tendinitis, likely supraspinatus involvement. As of today, she  notes no pain at rest or with activity resistance free following her injection. Though due to previous flare up with therapy will progress slowly to tolerance. at this time, they would patient would benefit from skilled therapy services to address previously stated impairments in order to maximize function with all ADLs.       Goals  ST-3 weeks  -pt will be able to   ojects ~5# to shoulder height without exacerbation of symptoms.  -pt will be able to improve R shoulder strength to 4+/5 in all planes    LT-66 weeks  -pt will be able to continue to report <1/10 in shoulder with activity  -pt will be able to  10#+ objects and move them to higher shelves pain free  -pt robbi be able to demonstrate improvement in overall function via projected FOTO score of 60      Plan  Patient would benefit from: skilled physical therapy  Referral necessary: No  Planned modality interventions: thermotherapy: hydrocollator  packs and electrical stimulation/Russian stimulation    Planned therapy interventions: balance, strengthening, stretching, therapeutic activities, therapeutic exercise and flexibility    Frequency: 2x week  Duration in weeks: 6  Plan of Care beginning date: 2024  Plan of Care expiration date: 10/17/2024        Subjective Evaluation    History of Present Illness  Mechanism of injury: Christy Cardozo states that pain started nearly 2 years ago when she had a mass removed from her right shoulder. She  states she then had therapy following this. However, notes that the pain was so significant afterwards that she stopped therapy due to being unable to raise arm. She has never tried therapy since. She has consistently had pain in shoulder with reaching out to her right side and working over head.  Notes that recent months had significant increase in clicking/cracking in shoulders, though not painful.  This has been worsening up until she received an injection in her shoulder ~ 2 weeks ago. Notes that today, she has minimal pains and discomfort compared to prior to the injection.           Recurrent probem    Quality of life: fair    Patient Goals  Patient goals for therapy: decreased pain, improved balance, increased motion, increased strength, independence with ADLs/IADLs, return to sport/leisure activities and return to work    Pain  Current pain ratin  At best pain ratin  At worst pain ratin  Location: right shoulder along supraspinatus tendon, lateral upper arm  Quality: grinding and sharp    Hand dominance: right    Treatments  Previous treatment: injection treatment and physical therapy        Objective     Tenderness     Left Shoulder   No tenderness     Right Shoulder  No tenderness     Active Range of Motion   Left Shoulder   Normal active range of motion    Right Shoulder   Normal active range of motion    Strength/Myotome Testing     Left Shoulder   Normal muscle strength    Right Shoulder      Planes of Motion   Flexion: 4   Extension: 4   Abduction: 4   External rotation at 0°: 4   External rotation at 90°: 4       Flowsheet Rows      Flowsheet Row Most Recent Value   PT/OT G-Codes    Current Score 59   Projected Score 60   FOTO information reviewed Yes   Assessment Type Evaluation             Precautions: mass resection of R shoulder     Daily Treatment Diary:      Initial Evaluation Date: 09/06/24  Compliance 9/5                     Visit Number 1                    Re-Eval  IE                 MC   Foto Captured Y                           9/5                     Manual                                                                                        Ther-Ex                      UBE ->            Shldr flx/scap/abd  AROM                                           Wand OH shoulder raises 1#->                                           No monies OTB                                                                 Prone y raise ->                     Prone rear delt raise ->                                           Neuro Re-Ed                                                                                                Ther-Act              moving objects to shoulder height+ When tolerable                                                Modalities

## 2024-09-05 NOTE — LETTER
2024    Manas Crawford MD  [address]  [facility] [address]    Patient: Christy Cardozo   YOB: 1955   Date of Visit: 2024     Encounter Diagnosis     ICD-10-CM    1. Impingement syndrome of right shoulder  M75.41           Dear Dr. Crawford:    Thank you for your recent referral of Christy Cardozo. Please review the attached evaluation summary from Christy's recent visit.     Please verify that you agree with the plan of care by signing the attached order.     If you have any questions or concerns, please do not hesitate to call.     I sincerely appreciate the opportunity to share in the care of one of your patients and hope to have another opportunity to work with you in the near future.       Sincerely,    Judah Iglesias, PT      Referring Provider:      I certify that I have read the below Plan of Care and certify the need for these services furnished under this plan of treatment while under my care.                    Mansa Crawford MD   [address]  [facility] [address]  Via Fax: [phone]          PT Evaluation     Today's date: 2024  Patient name: Christy Cardozo  : 1955  MRN: [id number]  Referring provider: No ref. provider found  Dx:   Encounter Diagnosis     ICD-10-CM    1. Impingement syndrome of right shoulder  M75.41           Start Time: 1345  Stop Time: 1445  Total time in clinic (min): 60 minutes    Assessment  Impairments: abnormal or restricted ROM, activity intolerance and impaired physical strength  Symptom irritability: low    Assessment details: Christy Cardozo is a pleasant 70 y/o with PMH of a mass resection in R shoulder. Presents to therapy with c/o of R shoulder pain and weakness. Today's assessment shows deficits in in R shoulder in all planes, however no pain with resistance today.  symmetrical. Special tests reveal Negative full/empty can, wilda easley, obriens, biceps load. Objective findings, further detailed below, along  with signs and symptoms most consistent with likely rotator cuff tendinitis, likely supraspinatus involvement. As of today, she  notes no pain at rest or with activity resistance free following her injection. Though due to previous flare up with therapy will progress slowly to tolerance. at this time, they would patient would benefit from skilled therapy services to address previously stated impairments in order to maximize function with all ADLs.       Goals  ST-3 weeks  -pt will be able to   ojects ~5# to shoulder height without exacerbation of symptoms.  -pt will be able to improve R shoulder strength to 4+/5 in all planes    LT-66 weeks  -pt will be able to continue to report <1/10 in shoulder with activity  -pt will be able to  10#+ objects and move them to higher shelves pain free  -pt robbi be able to demonstrate improvement in overall function via projected FOTO score of 60      Plan  Patient would benefit from: skilled physical therapy  Referral necessary: No  Planned modality interventions: thermotherapy: hydrocollator packs and electrical stimulation/Russian stimulation    Planned therapy interventions: balance, strengthening, stretching, therapeutic activities, therapeutic exercise and flexibility    Frequency: 2x week  Duration in weeks: 6  Plan of Care beginning date: 2024  Plan of Care expiration date: 10/17/2024        Subjective Evaluation    History of Present Illness  Mechanism of injury: Christy Cardozo states that pain started nearly 2 years ago when she had a mass removed from her right shoulder. She  states she then had therapy following this. However, notes that the pain was so significant afterwards that she stopped therapy due to being unable to raise arm. She has never tried therapy since. She has consistently had pain in shoulder with reaching out to her right side and working over head.  Notes that recent months had significant increase in clicking/cracking in  shoulders, though not painful.  This has been worsening up until she received an injection in her shoulder ~ 2 weeks ago. Notes that today, she has minimal pains and discomfort compared to prior to the injection.           Recurrent probem    Quality of life: fair    Patient Goals  Patient goals for therapy: decreased pain, improved balance, increased motion, increased strength, independence with ADLs/IADLs, return to sport/leisure activities and return to work    Pain  Current pain ratin  At best pain ratin  At worst pain ratin  Location: right shoulder along supraspinatus tendon, lateral upper arm  Quality: grinding and sharp    Hand dominance: right    Treatments  Previous treatment: injection treatment and physical therapy        Objective     Tenderness     Left Shoulder   No tenderness     Right Shoulder  No tenderness     Active Range of Motion   Left Shoulder   Normal active range of motion    Right Shoulder   Normal active range of motion    Strength/Myotome Testing     Left Shoulder   Normal muscle strength    Right Shoulder     Planes of Motion   Flexion: 4   Extension: 4   Abduction: 4   External rotation at 0°: 4   External rotation at 90°: 4       Flowsheet Rows      Flowsheet Row Most Recent Value   PT/OT G-Codes    Current Score 59   Projected Score 60   FOTO information reviewed Yes   Assessment Type Evaluation             Precautions: mass resection of R shoulder     Daily Treatment Diary:      Initial Evaluation Date: 24  Compliance                      Visit Number 1                    Re-Eval  IE                 MC   Foto Captured Y                                                Manual                                                                                        Ther-Ex                      UBE ->            Shldr flx/scap/abd  AROM                                           Wand OH shoulder raises 1#->                                           No monies OTB                                                                  Prone y raise ->                     Prone rear delt raise ->                                           Neuro Re-Ed                                                                                                Ther-Act              moving objects to shoulder height+ When tolerable                                                Modalities

## 2024-09-05 NOTE — LETTER
2024    Manas Crawford MD  [address]  [facility] [address]    Patient: Christy Cardozo   YOB: 1955   Date of Visit: 2024     Encounter Diagnosis     ICD-10-CM    1. Impingement syndrome of right shoulder  M75.41           Dear Dr. Crawford:    Thank you for your recent referral of Christy Cardozo. Please review the attached evaluation summary from Christy's recent visit.     Please verify that you agree with the plan of care by signing the attached order.     If you have any questions or concerns, please do not hesitate to call.     I sincerely appreciate the opportunity to share in the care of one of your patients and hope to have another opportunity to work with you in the near future.       Sincerely,    Judah Iglesias, PT      Referring Provider:      I certify that I have read the below Plan of Care and certify the need for these services furnished under this plan of treatment while under my care.                    Manas Crawford MD   [address]  [facility] [address]  Via Fax: [phone]          PT Evaluation     Today's date: 2024  Patient name: Christy Cardozo  : 1955  MRN: [id number]  Referring provider: No ref. provider found  Dx:   Encounter Diagnosis     ICD-10-CM    1. Impingement syndrome of right shoulder  M75.41           Start Time: 1345  Stop Time: 1445  Total time in clinic (min): 60 minutes    Assessment  Impairments: abnormal or restricted ROM, activity intolerance and impaired physical strength  Symptom irritability: low    Assessment details: Christy Cardozo is a pleasant 70 y/o with PMH of a mass resection in R shoulder. Presents to therapy with c/o of R shoulder pain and weakness. Today's assessment shows deficits in in R shoulder in all planes, however no pain with resistance today.  symmetrical. Special tests reveal Negative full/empty can, wilda easley, obriens, biceps load. Objective findings, further detailed below, along  with signs and symptoms most consistent with likely rotator cuff tendinitis, likely supraspinatus involvement. As of today, she  notes no pain at rest or with activity resistance free following her injection. Though due to previous flare up with therapy will progress slowly to tolerance. at this time, they would patient would benefit from skilled therapy services to address previously stated impairments in order to maximize function with all ADLs.       Goals  ST-3 weeks  -pt will be able to   ojects ~5# to shoulder height without exacerbation of symptoms.  -pt will be able to improve R shoulder strength to 4+/5 in all planes    LT-66 weeks  -pt will be able to continue to report <1/10 in shoulder with activity  -pt will be able to  10#+ objects and move them to higher shelves pain free  -pt robbi be able to demonstrate improvement in overall function via projected FOTO score of 60      Plan  Patient would benefit from: skilled physical therapy  Referral necessary: No  Planned modality interventions: thermotherapy: hydrocollator packs and electrical stimulation/Russian stimulation    Planned therapy interventions: balance, strengthening, stretching, therapeutic activities, therapeutic exercise and flexibility    Frequency: 2x week  Duration in weeks: 6  Plan of Care beginning date: 2024  Plan of Care expiration date: 10/17/2024        Subjective Evaluation    History of Present Illness  Mechanism of injury: Christy Cardozo states that pain started nearly 2 years ago when she had a mass removed from her right shoulder. She  states she then had therapy following this. However, notes that the pain was so significant afterwards that she stopped therapy due to being unable to raise arm. She has never tried therapy since. She has consistently had pain in shoulder with reaching out to her right side and working over head.  Notes that recent months had significant increase in clicking/cracking in  shoulders, though not painful.  This has been worsening up until she received an injection in her shoulder ~ 2 weeks ago. Notes that today, she has minimal pains and discomfort compared to prior to the injection.           Recurrent probem    Quality of life: fair    Patient Goals  Patient goals for therapy: decreased pain, improved balance, increased motion, increased strength, independence with ADLs/IADLs, return to sport/leisure activities and return to work    Pain  Current pain ratin  At best pain ratin  At worst pain ratin  Location: right shoulder along supraspinatus tendon, lateral upper arm  Quality: grinding and sharp    Hand dominance: right    Treatments  Previous treatment: injection treatment and physical therapy        Objective     Tenderness     Left Shoulder   No tenderness     Right Shoulder  No tenderness     Active Range of Motion   Left Shoulder   Normal active range of motion    Right Shoulder   Normal active range of motion    Strength/Myotome Testing     Left Shoulder   Normal muscle strength    Right Shoulder     Planes of Motion   Flexion: 4   Extension: 4   Abduction: 4   External rotation at 0°: 4   External rotation at 90°: 4       Flowsheet Rows      Flowsheet Row Most Recent Value   PT/OT G-Codes    Current Score 59   Projected Score 60   FOTO information reviewed Yes   Assessment Type Evaluation             Precautions: mass resection of R shoulder     Daily Treatment Diary:      Initial Evaluation Date: 24  Compliance                      Visit Number 1                    Re-Eval  IE                 MC   Foto Captured Y                                                Manual                                                                                        Ther-Ex                      UBE ->            Shldr flx/scap/abd  AROM                                           Wand [place] shoulder raises 1#->                                           No monies OTB                                                                  Prone y raise ->                     Prone rear delt raise ->                                           Neuro Re-Ed                                                                                                Ther-Act              moving objects to shoulder height+ When tolerable                                                Modalities

## 2024-09-12 ENCOUNTER — OFFICE VISIT (OUTPATIENT)
Dept: PHYSICAL THERAPY | Facility: CLINIC | Age: 69
End: 2024-09-12
Payer: COMMERCIAL

## 2024-09-12 DIAGNOSIS — M75.41 IMPINGEMENT SYNDROME OF RIGHT SHOULDER: Primary | ICD-10-CM

## 2024-09-12 PROCEDURE — 97112 NEUROMUSCULAR REEDUCATION: CPT

## 2024-09-12 PROCEDURE — 97110 THERAPEUTIC EXERCISES: CPT

## 2024-09-12 NOTE — PROGRESS NOTES
Daily Note     Today's date: 2024  Patient name: Christy Cardozo  : 1955  MRN: [id number]  Referring provider: Marc Vitale MD  Dx:   Encounter Diagnosis     ICD-10-CM    1. Impingement syndrome of right shoulder  M75.41           Start Time: 1115  Stop Time: 1200  Total time in clinic (min): 45 minutes    Subjective: Christy states that her shoulders become fatigued/sore from home program, though no pain.      Objective: See treatment diary below      Assessment: Tolerated treatment well. Patient demonstrated fatigue post treatment, exhibited good technique with therapeutic exercises, and would benefit from continued PT. Progressed program in volume and resistance to pt.'s tolerance. She reported no exacerbation of symptoms, demonstrated good techniques. Though did report fatigue by end of session. Will continue to monitor symptoms as she will continue to benefit from skilled therapy services.      Plan: Continue per plan of care.      Precautions: mass resection of R shoulder     Daily Treatment Diary:      Initial Evaluation Date: 24  Compliance                    Visit Number 1 2                   Re-Eval  IE                 MC   Foto Captured Y                                              Manual                                                                                        Ther-Ex                      UBE -> 2.5f  2.5b           Shldr flx/scap/abd  AROM  AROM  2# 15x ea dir                                         Wand OH shoulder raises 1#->  2# 20x                                         No monies OTB  GTB 2x15                   Banded pull aparts  GTB 2x15                                          Prone y raise ->  2# 2x15                   Prone rear delt raise ->  2# 1x15                                         Neuro Re-Ed                                                                                                Ther-Act              moving objects to shoulder  height+ When tolerable                                                Modalities

## 2024-09-17 ENCOUNTER — OFFICE VISIT (OUTPATIENT)
Dept: PHYSICAL THERAPY | Facility: CLINIC | Age: 69
End: 2024-09-17
Payer: COMMERCIAL

## 2024-09-17 DIAGNOSIS — M75.41 IMPINGEMENT SYNDROME OF RIGHT SHOULDER: Primary | ICD-10-CM

## 2024-09-17 PROCEDURE — 97110 THERAPEUTIC EXERCISES: CPT

## 2024-09-17 PROCEDURE — 97112 NEUROMUSCULAR REEDUCATION: CPT

## 2024-09-17 NOTE — PROGRESS NOTES
Daily Note     Today's date: 2024  Patient name: Christy Cardozo  : 1955  MRN: [id number]  Referring provider: Marc Vitale MD  Dx:   Encounter Diagnosis     ICD-10-CM    1. Impingement syndrome of right shoulder  M75.41           Start Time: 1115  Stop Time: 1200  Total time in clinic (min): 45 minutes    Subjective: Christy notes that her shoulder was sore after last session but not excessively, nor did it limit her function.      Objective: See treatment diary below      Assessment: Tolerated treatment well. Patient demonstrated fatigue post treatment, exhibited good technique with therapeutic exercises, and would benefit from continued PT. She tolerated addition of new exercises and increased volume well. Will cont to benefit from skilled therapy services.      Plan: Continue per plan of care.      Precautions: mass resection of R shoulder     Daily Treatment Diary:      Initial Evaluation Date: 24  Compliance                  Visit Number 1 2                   Re-Eval  IE                 MC   Foto Captured Y                                            Manual                                                                                        Ther-Ex                      UBE -> 2.5f  2.5b 2.5f 2.5b            Shldr flx/scap/abd  AROM  AROM  2# 15x ea dir  AROM 2# 15x ea direction                                       Wand OH shoulder raises 1#->  2# 20x  2# 20x                 Wand abd      2# 202x -> 5#                 OH shoulder press   5# 15x          No monies OTB  GTB 2x15  GTB 2x15                 Banded pull aparts  GTB 2x15   GTB 2x15                                       Prone y raise ->  2# 2x15  2# 2x15                 Prone rear delt raise + ER ->  2# 1x15  2# 1x15                 Prone lever pull   2# 2x15                                Neuro Re-Ed                                                                                                Ther-Act               moving objects to shoulder height+ When tolerable    10#x5  5# x10 fwd/L yusuf/R yusuf                                            Modalities

## 2024-09-24 ENCOUNTER — OFFICE VISIT (OUTPATIENT)
Dept: PHYSICAL THERAPY | Facility: CLINIC | Age: 69
End: 2024-09-24
Payer: COMMERCIAL

## 2024-09-24 DIAGNOSIS — M75.41 IMPINGEMENT SYNDROME OF RIGHT SHOULDER: Primary | ICD-10-CM

## 2024-09-24 PROCEDURE — 97112 NEUROMUSCULAR REEDUCATION: CPT

## 2024-09-24 PROCEDURE — 97110 THERAPEUTIC EXERCISES: CPT

## 2024-09-24 NOTE — PROGRESS NOTES
Daily Note     Today's date: 2024  Patient name: Christy Cardozo  : 1955  MRN: [id number]  Referring provider: Marc Vitale MD  Dx:   Encounter Diagnosis     ICD-10-CM    1. Impingement syndrome of right shoulder  M75.41           Start Time: 1110  Stop Time: 1200  Total time in clinic (min): 50 minutes    Subjective: Christy states that she has been using her shoulder without any pains or discomforts since last visit. Does notice the weakness in her shoulder/arm.      Objective: See treatment diary below      Assessment: Tolerated treatment well. Patient demonstrated fatigue post treatment, exhibited good technique with therapeutic exercises, and would benefit from continued PT. Progressed exercise program with increased resistances, increased volume, and decreased rest breaks. Reports fatigue though no pain or soreness in shoulder. Discussed with patient potential discharge next visit to [facility]      Plan: Continue per plan of care.  Potential discharge next visit.     Precautions: mass resection of R shoulder     Daily Treatment Diary:      Initial Evaluation Date: 24  Compliance                Visit Number 1 2  3 4                Re-Eval  IE                 MC   Foto Captured Y                                          Manual                                                                                        Ther-Ex                      UBE -> 2.5f  2.5b 2.5f 2.5b   2.5' ea         Shldr flx/scap/abd  AROM  AROM  2# 15x ea dir  AROM 2# 15x ea direction  AROM 2# 15x ea direction                                     Wand OH shoulder raises 1#->  2# 20x  2# 20x 5# 20x                Wand abd      2# 202x -> 5#  5# 20x2                OH shoulder press   5# 15x 9# 20x         No monies OTB  GTB 2x15  GTB 2x15  BTB 2x15               Banded pull aparts  GTB 2x15   GTB 2x15  GTB 2x15                                     Prone y raise ->  2# 2x15  2# 2x15   2#  2x15               Prone rear delt raise + ER ->  2# 1x15  2# 1x15  2#  2x15               Prone lever pull   2# 2x15 2#   2x15                               Neuro Re-Ed                                                                                                Ther-Act              moving objects to shoulder height+ When tolerable    10#x5  5# x10 fwd/L yusuf/R yusuf  15# x5  10# x10                                          Modalities

## 2024-10-01 ENCOUNTER — APPOINTMENT (OUTPATIENT)
Dept: PHYSICAL THERAPY | Facility: CLINIC | Age: 69
End: 2024-10-01
Payer: COMMERCIAL

## 2024-10-03 ENCOUNTER — OFFICE VISIT (OUTPATIENT)
Dept: PHYSICAL THERAPY | Facility: CLINIC | Age: 69
End: 2024-10-03
Payer: COMMERCIAL

## 2024-10-03 DIAGNOSIS — M75.41 IMPINGEMENT SYNDROME OF RIGHT SHOULDER: Primary | ICD-10-CM

## 2024-10-03 PROCEDURE — 97110 THERAPEUTIC EXERCISES: CPT

## 2024-10-03 PROCEDURE — 97112 NEUROMUSCULAR REEDUCATION: CPT

## 2024-10-03 PROCEDURE — 97530 THERAPEUTIC ACTIVITIES: CPT

## 2024-10-03 NOTE — PROGRESS NOTES
Daily Note     Today's date: 10/3/2024  Patient name: Christy Cardozo  : 1955  MRN: [id number]  Referring provider: Marc Vitale MD  Dx:   Encounter Diagnosis     ICD-10-CM    1. Impingement syndrome of right shoulder  M75.41           Start Time: 1110  Stop Time: 1210  Total time in clinic (min): 60 minutes    Subjective: Christy reports that she had a very difficulty week due to personal circumstances, however her shoulder has been feeling great.      Objective: See treatment diary below      Assessment: Tolerated treatment well. Patient demonstrated fatigue post treatment and exhibited good technique with therapeutic exercises. Christy reports that she hasn't had any increased pain or soreness following previous sessions increased volume. She has been able to return to her full daily activities without any discomfort. Does experience faitgue quicker in R shoulder than L, though only mildly. At this time, she has achieved all established goals and is appropriate for discharge from therapy. Pt is in agreement with plan and final HEP      Plan: Continue per plan of care.      Precautions: mass resection of R shoulder     Daily Treatment Diary:      Initial Evaluation Date: 24  Compliance 9/5  9/12  9/17  9/24  10/3             Visit Number 1 2  3 4   5             Re-Eval  IE                 MC   Foto Captured Y       Y                    9/5  9/12  9/17  9/24  10/3             Manual                                                                                        Ther-Ex                      UBE -> 2.5f  2.5b 2.5f 2.5b   2.5' ea         Shldr flx/scap/abd  AROM  AROM  2# 15x ea dir  AROM 2# 15x ea direction  AROM 2# 15x ea direction  2# 15x2 ea dir                                   Wand OH shoulder raises 1#->  2# 20x  2# 20x 5# 20x  -            Wand abd      2# 202x -> 5#  5# 20x2   5# 20x             OH shoulder press   5# 15x 9# 20x 9# 3x10        No monies OTB  GTB 2x15  GTB 2x15  BTB 2x15   BTB 2x15             Banded pull aparts  GTB 2x15   GTB 2x15  GTB 2x15  BTB 2x15                                   Prone y raise ->  2# 2x15  2# 2x15  2#  2x15  2# 2x15             Prone rear delt raise + ER ->  2# 1x15  2# 1x15  2#  2x15  2#  2x15             Prone lever pull   2# 2x15 2#   2x15 2#  2x15                              Neuro Re-Ed                                                                                                Ther-Act              moving objects to shoulder height+ When tolerable    10#x5  5# x10 fwd/L yusuf/R yusuf  15# x5  10# x10  15# x5  10# x10                                        Modalities

## 2024-12-06 ENCOUNTER — DOCTOR'S OFFICE (OUTPATIENT)
Dept: URBAN - NONMETROPOLITAN AREA CLINIC 1 | Facility: CLINIC | Age: 69
Setting detail: OPHTHALMOLOGY
End: 2024-12-06
Payer: COMMERCIAL

## 2024-12-06 DIAGNOSIS — H52.13: ICD-10-CM

## 2024-12-06 DIAGNOSIS — H52.4: ICD-10-CM

## 2024-12-06 DIAGNOSIS — H35.3131: ICD-10-CM

## 2024-12-06 PROCEDURE — 92015 DETERMINE REFRACTIVE STATE: CPT | Performed by: OPTOMETRIST

## 2024-12-06 PROCEDURE — 92014 COMPRE OPH EXAM EST PT 1/>: CPT | Performed by: OPTOMETRIST

## 2024-12-06 PROCEDURE — 92134 CPTRZ OPH DX IMG PST SGM RTA: CPT | Performed by: OPTOMETRIST

## 2024-12-06 ASSESSMENT — REFRACTION_CURRENTRX
OD_AXIS: 140
OS_CYLINDER: -0.25
OD_OVR_VA: 20/
OS_SPHERE: -1.25
OS_OVR_VA: 20/
OD_CYLINDER: -0.75
OD_VPRISM_DIRECTION: BF
OS_AXIS: 150
OD_SPHERE: -1.25
OS_VPRISM_DIRECTION: BF
OD_ADD: +2.50
OS_ADD: +2.50

## 2024-12-06 ASSESSMENT — REFRACTION_MANIFEST
OS_VA1: 20/20-2
OS_AXIS: 150
OS_SPHERE: -1.25
OD_VA1: 20/25+2
OD_SPHERE: -1.25
OS_VA2: 20/20-2
OS_CYLINDER: -0.25
OD_AXIS: 140
OD_VA2: 20/20-2
OD_CYLINDER: -0.75
OD_ADD: +2.50
OS_ADD: +2.50

## 2024-12-06 ASSESSMENT — REFRACTION_AUTOREFRACTION
OS_SPHERE: -0.75
OD_CYLINDER: -0.75
OD_AXIS: 159
OD_SPHERE: -0.75
OS_AXIS: 106
OS_CYLINDER: -0.25

## 2024-12-06 ASSESSMENT — KERATOMETRY
OD_K1POWER_DIOPTERS: 43.00
OS_K1POWER_DIOPTERS: 43.00
OD_AXISANGLE_DEGREES: 176
OS_AXISANGLE_DEGREES: 178
OD_K2POWER_DIOPTERS: 44.00
OS_K2POWER_DIOPTERS: 44.25

## 2024-12-06 ASSESSMENT — CONFRONTATIONAL VISUAL FIELD TEST (CVF)
OS_FINDINGS: FULL
OD_FINDINGS: FULL

## 2024-12-06 ASSESSMENT — TONOMETRY
OD_IOP_MMHG: 14
OS_IOP_MMHG: 14

## 2024-12-06 ASSESSMENT — VISUAL ACUITY
OS_BCVA: 20/25-1
OD_BCVA: 20/20

## 2025-06-26 ENCOUNTER — OFFICE VISIT (OUTPATIENT)
Dept: URGENT CARE | Facility: CLINIC | Age: 70
End: 2025-06-26
Payer: COMMERCIAL

## 2025-06-26 VITALS
HEIGHT: 63 IN | BODY MASS INDEX: 23.92 KG/M2 | TEMPERATURE: 96.2 F | OXYGEN SATURATION: 95 % | RESPIRATION RATE: 18 BRPM | SYSTOLIC BLOOD PRESSURE: 128 MMHG | HEART RATE: 80 BPM | WEIGHT: 135 LBS | DIASTOLIC BLOOD PRESSURE: 80 MMHG

## 2025-06-26 DIAGNOSIS — B02.9 HERPES ZOSTER WITHOUT COMPLICATION: Primary | ICD-10-CM

## 2025-06-26 PROCEDURE — G0382 LEV 3 HOSP TYPE B ED VISIT: HCPCS

## 2025-06-26 PROCEDURE — 99283 EMERGENCY DEPT VISIT LOW MDM: CPT

## 2025-06-26 RX ORDER — METHOTREXATE 2.5 MG/1
TABLET ORAL
COMMUNITY
Start: 2025-04-08

## 2025-06-26 RX ORDER — FAMCICLOVIR 500 MG/1
500 TABLET ORAL 3 TIMES DAILY
Qty: 21 TABLET | Refills: 0 | Status: SHIPPED | OUTPATIENT
Start: 2025-06-26 | End: 2025-07-03

## 2025-06-26 RX ORDER — DULOXETIN HYDROCHLORIDE 60 MG/1
1 CAPSULE, DELAYED RELEASE ORAL DAILY
COMMUNITY
Start: 2025-06-09

## 2025-06-26 NOTE — PATIENT INSTRUCTIONS
Famciclovir as prescribed.     Keep affected area covered to avoid transmission      Take Ibuprofen and use lidocaine patches over the counter  Cool compresses over area  Talk to your PCP about varicella zoster vaccine    Follow up with PCP in 3-5 days.  Proceed to  ER if symptoms worsen.    If tests have been performed at [facility], our office will contact you with results if changes need to be made to the care plan discussed with you at the visit.  You can review your full results on [facility] MyChart.

## 2025-06-26 NOTE — PROGRESS NOTES
[facility]        NAME: Christy Cardozo is a 70 y.o. female  : 1955    MRN: [id number]  DATE: 2025  TIME: 1:51 PM    Assessment and Plan   Herpes zoster without complication [B02.9]  1. Herpes zoster without complication  famciclovir (FAMVIR) 500 mg tablet        Rash is consistent in appearance with shingles.  Plan to treat with famciclovir at this time for management of symptoms.  Encouraged to continue using lidocaine patches for pain management.  Monitor closely for signs of infection. Tylenol/ibuprofen for pain/fever. Increased fluids & rest. May continue with other OTC and supportive therapies at home. Encouraged to follow up closely with family physician or healthcare provider for follow up as well as future shingles vaccination. Strict ED precautions provided/discussed. Patient in agreement with plan & verbalized understanding.      Patient Instructions   Famciclovir as prescribed.     Keep affected area covered to avoid transmission      Take Ibuprofen and use lidocaine patches over the counter  Cool compresses over area  Talk to your PCP about varicella zoster vaccine    Follow up with PCP in 3-5 days.  Proceed to  ER if symptoms worsen.    If tests have been performed at [facility], our office will contact you with results if changes need to be made to the care plan discussed with you at the visit.  [facility]    Chief Complaint     Chief Complaint   Patient presents with    Rash     C/o painful rash on right arm. Onset x5 days ago Pt states she thinks it may be shingles.          History of Present Illness       Patient is 70-year-old female presents today for evaluation of possible shingles rash.  She reports that the rash has been present for the last 5 days and is gradually becoming worse.  Rash is present to right upper back as well as entire right upper extremity including the hand and fingers.  Patient states that the rashes are  painful with some itching.  She notes that there were some vesicles that had been previously draining as well.  Patient states that she has never had shingles in the past however does have history of varicella as a child.  She did not receive the shingles vaccination from her PCP.  Patient  has been using both calamine lotion and lidocaine patches which have proven to provide some relief.  Patient denies experiencing any recent illness or increase in stressors, however does note that she takes weekly methotrexate for rheumatoid arthritis.    Rash  This is a new problem. The current episode started in the past 7 days (x5 days). The problem has been gradually worsening since onset. The affected locations include the right arm, right elbow, right hand, right wrist, back and right fingers. The problem is moderate. The rash is characterized by pain, itchiness, blistering and redness. She was exposed to nothing. The rash first occurred at home. Associated symptoms include decreased sleep and itching. Pertinent negatives include no congestion, cough, decreased physical activity, decreased responsiveness, drinking less, facial edema, fatigue, fever, rhinorrhea, shortness of breath, sore throat or vomiting. Treatments tried: Calamine & lidocaine patches. The treatment provided mild relief. Her past medical history is significant for varicella. There were no sick contacts.       Review of Systems   Review of Systems   Constitutional:  Negative for activity change, appetite change, chills, decreased responsiveness, diaphoresis, fatigue and fever.   HENT:  Negative for congestion, facial swelling, mouth sores, rhinorrhea, sore throat and trouble swallowing.    Eyes:  Negative for pain, redness and itching.   Respiratory:  Negative for cough, chest tightness, shortness of breath and wheezing.    Cardiovascular:  Negative for chest pain.   Gastrointestinal:  Negative for abdominal pain, nausea and vomiting.   Musculoskeletal:   "Positive for myalgias. Negative for arthralgias, back pain, gait problem, joint swelling, neck pain and neck stiffness.   Skin:  Positive for color change, itching and rash. Negative for pallor and wound.   Neurological:  Negative for dizziness, speech difficulty, weakness, light-headedness, numbness and headaches.   Hematological:  Negative for adenopathy.         Current Medications     Current Medications[1]    Current Allergies     Allergies as of 06/26/2025    (No Known Allergies)            The following portions of the patient's history were reviewed and updated as appropriate: allergies, current medications, past family history, past medical history, past social history, past surgical history and problem list.     Past Medical History[2]    Past Surgical History[3]    Family History[4]      Medications have been verified.        Objective   /80   Pulse 80   Temp (!) 96.2 °F (35.7 °C)   Resp 18   Ht 5' 3\" (1.6 m)   Wt 61.2 kg (135 lb)   SpO2 95%   BMI 23.91 kg/m²   No LMP recorded.       Physical Exam     Physical Exam  Vitals and nursing note reviewed.   Constitutional:       General: She is not in acute distress.     Appearance: Normal appearance. She is ill-appearing. She is not toxic-appearing or diaphoretic.   HENT:      Head: Normocephalic and atraumatic.      Right Ear: Tympanic membrane, ear canal and external ear normal.      Left Ear: Tympanic membrane, ear canal and external ear normal.      Nose: Nose normal.      Mouth/Throat:      Mouth: Mucous membranes are moist.      Pharynx: Oropharynx is clear.     Eyes:      Extraocular Movements: Extraocular movements intact.      Conjunctiva/sclera: Conjunctivae normal.      Pupils: Pupils are equal, round, and reactive to light.       Cardiovascular:      Rate and Rhythm: Normal rate and regular rhythm.      Pulses: Normal pulses.      Heart sounds: Normal heart sounds.   Pulmonary:      Effort: Pulmonary effort is normal. No respiratory " distress.      Breath sounds: Normal breath sounds. No stridor. No wheezing, rhonchi or rales.   Chest:      Chest wall: No tenderness.   Abdominal:      General: Abdomen is flat.      Palpations: Abdomen is soft.     Musculoskeletal:         General: Normal range of motion.      Cervical back: Normal range of motion and neck supple.     Skin:     General: Skin is warm and dry.      Capillary Refill: Capillary refill takes less than 2 seconds.      Coloration: Skin is not pale.      Findings: Erythema and rash present. No bruising or lesion. Rash is papular and vesicular.      Comments: Red papular and vesicular rash present through entirety of right upper extremity including fingers and dorsal/palmar aspect of right hand.  Patient is also noted to have a small scattered areas of similar rash noted to right upper back.  No visible open wounds on exam.  No visible bleeding/drainage noted.  Affected areas are TTP.       Neurological:      General: No focal deficit present.      Mental Status: She is alert. Mental status is at baseline.      Motor: No weakness.      Gait: Gait normal.     Psychiatric:         Mood and Affect: Mood normal.         Behavior: Behavior normal.         Thought Content: Thought content normal.         Judgment: Judgment normal.                        [1]   Current Outpatient Medications:     Cholecalciferol 125 MCG (5000 UT) capsule, Take by mouth, Disp: , Rfl:     DULoxetine (CYMBALTA) 60 mg delayed release capsule, Take 1 capsule by mouth in the morning, Disp: , Rfl:     famciclovir (FAMVIR) 500 mg tablet, Take 1 tablet (500 mg total) by mouth 3 (three) times a day for 7 days, Disp: 21 tablet, Rfl: 0    methotrexate 2.5 MG tablet, Take by mouth, Disp: , Rfl:   [2]   Past Medical History:  Diagnosis Date    Arthritis    [3]   Past Surgical History:  Procedure Laterality Date    SHOULDER SURGERY     [4] No family history on file.